# Patient Record
Sex: FEMALE | Race: WHITE
[De-identification: names, ages, dates, MRNs, and addresses within clinical notes are randomized per-mention and may not be internally consistent; named-entity substitution may affect disease eponyms.]

---

## 2022-11-16 ENCOUNTER — HOSPITAL ENCOUNTER (EMERGENCY)
Dept: HOSPITAL 94 - ER | Age: 42
Discharge: LEFT BEFORE DISCHARGE | End: 2022-11-16
Payer: COMMERCIAL

## 2022-11-16 VITALS — BODY MASS INDEX: 36.42 KG/M2 | HEIGHT: 68 IN | WEIGHT: 240.3 LBS

## 2022-11-16 VITALS — DIASTOLIC BLOOD PRESSURE: 91 MMHG | SYSTOLIC BLOOD PRESSURE: 124 MMHG

## 2022-11-16 DIAGNOSIS — M25.559: Primary | ICD-10-CM

## 2022-11-16 DIAGNOSIS — Z53.21: ICD-10-CM

## 2022-11-16 NOTE — NUR
PT LEFT FAST-TRACK WITHOUT BEING SEEN BY THE PROVIDER. PT REFUSED TO SIGN AN 
AMA FORM, STATING "WE WILL NOT BE SIGNING ANYTHING SINCE WE HAVE NOT BEEN 
SEEN." EDUCATION WAS ATTEMPTED AND PT REFUSED. PT ALONG WITH S/O AMBULATED OUT 
THE ED. EDMD AND CHARGE NURSE WERE NOTIFED.